# Patient Record
Sex: FEMALE | Race: BLACK OR AFRICAN AMERICAN | NOT HISPANIC OR LATINO
[De-identification: names, ages, dates, MRNs, and addresses within clinical notes are randomized per-mention and may not be internally consistent; named-entity substitution may affect disease eponyms.]

---

## 2022-11-03 ENCOUNTER — APPOINTMENT (OUTPATIENT)
Dept: PAIN MANAGEMENT | Facility: CLINIC | Age: 32
End: 2022-11-03
Payer: OTHER MISCELLANEOUS

## 2022-11-03 VITALS — SYSTOLIC BLOOD PRESSURE: 136 MMHG | HEART RATE: 76 BPM | DIASTOLIC BLOOD PRESSURE: 76 MMHG

## 2022-11-03 PROBLEM — Z00.00 ENCOUNTER FOR PREVENTIVE HEALTH EXAMINATION: Status: ACTIVE | Noted: 2022-11-03

## 2022-11-03 PROCEDURE — 99072 ADDL SUPL MATRL&STAF TM PHE: CPT

## 2022-11-03 PROCEDURE — 99205 OFFICE O/P NEW HI 60 MIN: CPT

## 2022-11-03 RX ORDER — METHYLPREDNISOLONE 4 MG/1
4 TABLET ORAL
Qty: 21 | Refills: 0 | Status: ACTIVE | COMMUNITY
Start: 2022-11-03 | End: 1900-01-01

## 2022-11-03 NOTE — ASSESSMENT
[FreeTextEntry1] : 32-year-old female presenting with severe lumbar radicular pain.  I will further workup her symptoms with an MRI lumbar spine as well as an EMG of the lower extremities.  She will also begin aggressive physical therapy.  For pain control, we will prescribe a Medrol Dosepak.  She will follow-up in 6 weeks for reassessment.\par \par MRI of the lumbar spine was ordered to delineate spine pathology such as disc displacement and stenosis.\par \par An Lower EMG/NCV was ordered to delineate radiculopathy vs neuropathies vs nerve damage.\par \par Physical therapy of the lumbar spine 2-3 times a week for 4-8 weeks stressing a home exercise program of walking, shoulder griddle strengthening,  swimming, elliptical , recumbent bike, Onesimo chi and Yoga. Use things that heat like hot shower or icy heat before rehab, exercising and at the beginning of the day, and ice (ice in a bag never directly on the skin) after activity and at the end of the day.\par \par Disability status:\par Temporary total disability. Patient is unable to return to work at this time.\par \par Entered by Indu Banks, acting as scribe for Dr. Dunn.\par  \par The documentation recorded by the scribe, in my presence, accurately reflects the service I personally performed, and the decisions made by me with my edits as appropriate.\par  \par Best Regards, \par Endy Dunn MD \par Board Certified, Anesthesiology \par Board Certified, Pain Medicine\par \par

## 2022-11-03 NOTE — HISTORY OF PRESENT ILLNESS
[FreeTextEntry1] : Ms. 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  She states she was transferring a patient when she sustained an injury to the right side of her lower lumbar spine.  She states the pain began immediately after the accident.  She states she did go to the urgent care on the same day where an x-ray was taken which was negative.  She states she was given oral medications which did not help.  She currently rates her pain at a 10/10 on the pain scale and is severe.  She states the pain is across the lower lumbar spine with association of radiation into the right lower extremity and into the thigh.  She states the pain is constant in nature.  She states she has significant trouble with sitting, standing or walking secondary to the pain.  She denies any previous injuries to her lower back.\par \par

## 2022-11-03 NOTE — PHYSICAL EXAM
[de-identified] : Constitutional\par GENERAL APPEARANCE OF PATIENT IS WELL DEVELOPED, WELL NOURISHED, BODY HABITUS NORMAL, WELL GROOMED, NO DEFORMITIES NOTED. \par \par Head\par -          Atraumatic and Normocephalic \par \par Eyes, Nose, and Throat:\par -          External inspection of ears and nose are normal overall without scars, lesions, or masses noted\par -          Assessment of hearing is normal\par \par Neck\par -          Examination of neck shows no masses, overall appearance is normal, neck is symmetric, tracheal position is midline, no crepitus is noted\par -          Examination of thyroid shows no enlargement, tenderness or masses\par \par Respiratory\par -          Assessment of respiratory effort shows no intercostal retractions, no use of accessory muscles, unlabored breathing, and normal diaphragmatic movement.\par \par Cardiovascular\par -          Examination of extremities show no edema or varicosities\par \par Musculoskeletal\par -           Inspection and palpation of digits and nails shows no clubbing, cyanosis, nodules, drainage, fluctuance, petechiae\par \par 1)         Spine- tenderness into the lumbar paraspinals. ROM restricted. Pain with flexion and extension. Positive SLR on the right. \par \par 2)         Neck- inspection and palpation shows no misalignment, asymmetry, crepitation, defects, tenderness, masses, effusions. ROM is normal without crepitation or contracture. No instability or subluxation or laxity is noted. No abnormal movements.\par \par 3)         RUE- inspection and palpation shows no misalignment, asymmetry, crepitation, defects, tenderness, masses, effusions. ROM is normal without crepitation or contracture. No instability or subluxation or laxity is noted. No abnormal movements.\par \par 4)         LUE-inspection and palpation shows no misalignment, asymmetry, crepitation, defects, tenderness, masses, effusions. ROM is normal without crepitation or contracture. No instability or subluxation or laxity is noted. No abnormal movements.\par \par 5)         RLE- inspection and palpation shows no misalignment, asymmetry, crepitation, defects, tenderness, masses, effusions. ROM is normal without crepitation or contracture. No instability or subluxation or laxity is noted. No abnormal movements.\par \par 6)         LLE-inspection and palpation shows no misalignment, asymmetry, crepitation, defects, tenderness, masses, effusions. ROM is normal without crepitation or contracture. No instability or subluxation or laxity is noted. No abnormal movements.\par \par Skin\par -           Inspection of skin and subcutaneous tissue shows no rashes, lesions or ulcers\par -           Palpation of skin and subcutaneous tissue shows no rashes, no indurations, subcutaneous nodules or tightening.\par \par  Abdomen\par -           Soft; Non-tender\par \par Neurologic\par -           CN 2-12 are grossly intact\par -           No sensory or motor deficits in the upper and lower extremities\par -           Adequate strength in upper and lower extremities\par \par Psychiatric\par -           Patients judgment and insight are intact\par -           Oriented to time, place and person\par -           Recent and remote memory intact.

## 2022-12-08 ENCOUNTER — APPOINTMENT (OUTPATIENT)
Dept: PAIN MANAGEMENT | Facility: CLINIC | Age: 32
End: 2022-12-08

## 2022-12-08 VITALS — BODY MASS INDEX: 32.49 KG/M2 | WEIGHT: 195 LBS | HEIGHT: 65 IN

## 2022-12-08 PROCEDURE — 99215 OFFICE O/P EST HI 40 MIN: CPT

## 2022-12-08 PROCEDURE — 99072 ADDL SUPL MATRL&STAF TM PHE: CPT

## 2022-12-08 NOTE — PHYSICAL EXAM
[de-identified] : Constitutional\par GENERAL APPEARANCE OF PATIENT IS WELL DEVELOPED, WELL NOURISHED, BODY HABITUS NORMAL, WELL GROOMED, NO DEFORMITIES NOTED. \par \par Head\par -          Atraumatic and Normocephalic \par \par Eyes, Nose, and Throat:\par -          External inspection of ears and nose are normal overall without scars, lesions, or masses noted\par -          Assessment of hearing is normal\par \par Neck\par -          Examination of neck shows no masses, overall appearance is normal, neck is symmetric, tracheal position is midline, no crepitus is noted\par -          Examination of thyroid shows no enlargement, tenderness or masses\par \par Respiratory\par -          Assessment of respiratory effort shows no intercostal retractions, no use of accessory muscles, unlabored breathing, and normal diaphragmatic movement.\par \par Cardiovascular\par -          Examination of extremities show no edema or varicosities\par \par Musculoskeletal\par -           Inspection and palpation of digits and nails shows no clubbing, cyanosis, nodules, drainage, fluctuance, petechiae\par \par 1)         Spine- tenderness into the lumbar paraspinals. ROM restricted. Pain with flexion and extension. Positive SLR on the right. \par \par 2)         Neck- inspection and palpation shows no misalignment, asymmetry, crepitation, defects, tenderness, masses, effusions. ROM is normal without crepitation or contracture. No instability or subluxation or laxity is noted. No abnormal movements.\par \par 3)         RUE- inspection and palpation shows no misalignment, asymmetry, crepitation, defects, tenderness, masses, effusions. ROM is normal without crepitation or contracture. No instability or subluxation or laxity is noted. No abnormal movements.\par \par 4)         LUE-inspection and palpation shows no misalignment, asymmetry, crepitation, defects, tenderness, masses, effusions. ROM is normal without crepitation or contracture. No instability or subluxation or laxity is noted. No abnormal movements.\par \par 5)         RLE- inspection and palpation shows no misalignment, asymmetry, crepitation, defects, tenderness, masses, effusions. ROM is normal without crepitation or contracture. No instability or subluxation or laxity is noted. No abnormal movements.\par \par 6)         LLE-inspection and palpation shows no misalignment, asymmetry, crepitation, defects, tenderness, masses, effusions. ROM is normal without crepitation or contracture. No instability or subluxation or laxity is noted. No abnormal movements.\par \par Skin\par -           Inspection of skin and subcutaneous tissue shows no rashes, lesions or ulcers\par -           Palpation of skin and subcutaneous tissue shows no rashes, no indurations, subcutaneous nodules or tightening.\par \par  Abdomen\par -           Soft; Non-tender\par \par Neurologic\par -           CN 2-12 are grossly intact\par -           No sensory or motor deficits in the upper and lower extremities\par -           Adequate strength in upper and lower extremities\par \par Psychiatric\par -           Patients judgment and insight are intact\par -           Oriented to time, place and person\par -           Recent and remote memory intact.

## 2022-12-08 NOTE — ASSESSMENT
[FreeTextEntry1] : 32-year-old female presenting with ongoing severe lumbar radiculopathy. Her workers compensation carrier has yet to respond to authorization for her MRI of the lumbar spine and EMG of the lower extremities. I advised her she needs to reach out to her  regarding getting these things approved. She does require a form to be filled out for disability. Follow up in 6 weeks will be made for reassessment.\par \par Disability status:\par Temporary total disability. Patient is unable to return to work at this time.\par \par Entered by Indu Banks, acting as scribe for Dr. Dunn.\par  \par The documentation recorded by the scribe, in my presence, accurately reflects the service I personally performed, and the decisions made by me with my edits as appropriate.\par  \par Best Regards, \par Endy Dunn MD \par Board Certified, Anesthesiology \par Board Certified, Pain Medicine\par \par

## 2022-12-08 NOTE — HISTORY OF PRESENT ILLNESS
[FreeTextEntry1] : Ms. 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  She states she was transferring a patient when she sustained an injury to the right side of her lower lumbar spine.  She states the pain began immediately after the accident.  She states she did go to the urgent care on the same day where an x-ray was taken which was negative.  She states she was given oral medications which did not help.  She currently rates her pain at a 10/10 on the pain scale and is severe.  She states the pain is across the lower lumbar spine with association of radiation into the right lower extremity and into the thigh.  She states the pain is constant in nature.  She states she has significant trouble with sitting, standing or walking secondary to the pain.  She denies any previous injuries to her lower back.\par \par TODAY: Revisit encounter. \par \par She continues today with ongoing back pain. She still notes pain which is mainly on the right side of her lower lumbar spine. She has pain which radiates into the right lower extremity extending into the right thigh. She notes some continued numbness and tingling in that area. She states she has ongoing pain with standing or walking. She currently rates her pain at a 8/10 on the pain scale. She states the pain is constant in nature and present daily. She is still awaiting approval of her MRI and EMG by workers compensation.

## 2023-01-02 ENCOUNTER — FORM ENCOUNTER (OUTPATIENT)
Age: 33
End: 2023-01-02

## 2023-01-05 ENCOUNTER — APPOINTMENT (OUTPATIENT)
Dept: PAIN MANAGEMENT | Facility: CLINIC | Age: 33
End: 2023-01-05

## 2023-02-09 ENCOUNTER — APPOINTMENT (OUTPATIENT)
Dept: PAIN MANAGEMENT | Facility: CLINIC | Age: 33
End: 2023-02-09
Payer: OTHER MISCELLANEOUS

## 2023-02-09 VITALS — WEIGHT: 195 LBS | BODY MASS INDEX: 32.49 KG/M2 | HEIGHT: 65 IN

## 2023-02-09 PROCEDURE — 99072 ADDL SUPL MATRL&STAF TM PHE: CPT

## 2023-02-09 PROCEDURE — 99214 OFFICE O/P EST MOD 30 MIN: CPT

## 2023-02-13 NOTE — ASSESSMENT
[FreeTextEntry1] : 32-year-old female presenting with ongoing severe lumbar radiculopathy. Her workers compensation carrier has yet to respond to authorization for her MRI of the lumbar spine and EMG of the lower extremities. I will re-order both the MRI of her lumbar spine and EMG. I advised her she needs to reach out to her  regarding getting these things approved. Follow up in 6 weeks will be made for reassessment.\par \par Disability status:\par Temporary total disability. Patient is unable to return to work at this time.\par \par  Entered by Oumou Sanford, acting as scribe for Dr. Dunn.\par  \par The documentation recorded by the scribe, in my presence, accurately reflects the service I personally performed, and the decisions made by me with my edits as appropriate.\par  \par Best Regards, \par Endy Dunn MD \par Board Certified, Anesthesiology \par Board Certified, Pain Medicine\par \par \par

## 2023-02-13 NOTE — HISTORY OF PRESENT ILLNESS
[FreeTextEntry1] : Ms. 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  She states she was transferring a patient when she sustained an injury to the right side of her lower lumbar spine.  She states the pain began immediately after the accident.  She states she did go to the urgent care on the same day where an x-ray was taken which was negative.  She states she was given oral medications which did not help.  She currently rates her pain at a 10/10 on the pain scale and is severe.  She states the pain is across the lower lumbar spine with association of radiation into the right lower extremity and into the thigh.  She states the pain is constant in nature.  She states she has significant trouble with sitting, standing or walking secondary to the pain.  She denies any previous injuries to her lower back.\par \par TODAY: Revisit encounter. Patient continues to have ongoing back pain. Patient rates her pain 10/10 on pain scale, with pain radiating into her right hip. Patient has difficulty with standing, walking and sitting and is highly frustrated with her symptoms. She states she is unable to sleep secondary to her pain. She notes numbness and tingling in that area. She is still awaiting approval of her MRI and EMG by workers compensation.

## 2023-03-29 ENCOUNTER — APPOINTMENT (OUTPATIENT)
Dept: PAIN MANAGEMENT | Facility: CLINIC | Age: 33
End: 2023-03-29
Payer: OTHER MISCELLANEOUS

## 2023-03-29 VITALS — BODY MASS INDEX: 32.49 KG/M2 | HEIGHT: 65 IN | WEIGHT: 195 LBS

## 2023-03-29 PROCEDURE — 99215 OFFICE O/P EST HI 40 MIN: CPT

## 2023-03-29 NOTE — ASSESSMENT
[FreeTextEntry1] : 32-year-old female presenting with ongoing severe lumbar radiculopathy. She has approval for a MRI of the lumbar spine for which she is scheduled to under this on Wednesday. Follow up in 6 weeks will be made for reassessment and to discuss imaging. \par \par Disability status:\par Temporary total disability. Patient is unable to return to work at this time.\par \par Entered by Indu Banks, acting as scribe for Dr. Dunn.\par  \par The documentation recorded by the scribe, in my presence, accurately reflects the service I personally performed, and the decisions made by me with my edits as appropriate.\par  \par Best Regards, \par Endy Dunn MD \par Board Certified, Anesthesiology \par Board Certified, Pain Medicine\par  \par \par \par

## 2023-03-29 NOTE — PHYSICAL EXAM
[de-identified] : SPINE - tenderness into the lumbar paraspinals. ROM restricted. Pain with flexion and extension. Positive SLR on the right. \par \par HIP - tenderness noted. ROM restricted.

## 2023-03-29 NOTE — HISTORY OF PRESENT ILLNESS
[FreeTextEntry1] : Ms. 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  She states she was transferring a patient when she sustained an injury to the right side of her lower lumbar spine.  She states the pain began immediately after the accident.  She states she did go to the urgent care on the same day where an x-ray was taken which was negative.  She states she was given oral medications which did not help.  She currently rates her pain at a 10/10 on the pain scale and is severe.  She states the pain is across the lower lumbar spine with association of radiation into the right lower extremity and into the thigh.  She states the pain is constant in nature.  She states she has significant trouble with sitting, standing or walking secondary to the pain.  She denies any previous injuries to her lower back.\par \par TODAY: Revisit encounter. Patient continues to have ongoing back pain. Patient rates her pain 10/10 on pain scale, with pain radiating into her right hip. Patient has difficulty with standing, walking and sitting and is highly frustrated with her symptoms. She states she is unable to sleep secondary to her pain. She notes numbness and tingling in that area. She is pending MRI and EMG.

## 2023-04-26 ENCOUNTER — APPOINTMENT (OUTPATIENT)
Dept: PAIN MANAGEMENT | Facility: CLINIC | Age: 33
End: 2023-04-26
Payer: OTHER MISCELLANEOUS

## 2023-04-26 VITALS — HEIGHT: 65 IN | WEIGHT: 195 LBS | BODY MASS INDEX: 32.49 KG/M2

## 2023-04-26 PROCEDURE — 99215 OFFICE O/P EST HI 40 MIN: CPT

## 2023-04-26 NOTE — PHYSICAL EXAM
[de-identified] : SPINE - tenderness into the lumbar paraspinals. ROM restricted. Pain with flexion and extension. Positive SLR on the right. Mild decrease in sensory L5 dermatome bilaterally. Absent Achilles reflex bilaterally. \par \par HIP - tenderness noted. ROM restricted.

## 2023-04-26 NOTE — HISTORY OF PRESENT ILLNESS
[FreeTextEntry1] : Ms. 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  She states she was transferring a patient when she sustained an injury to the right side of her lower lumbar spine.  She states the pain began immediately after the accident.  She states she did go to the urgent care on the same day where an x-ray was taken which was negative.  She states she was given oral medications which did not help.  She currently rates her pain at a 10/10 on the pain scale and is severe.  She states the pain is across the lower lumbar spine with association of radiation into the right lower extremity and into the thigh.  She states the pain is constant in nature.  She states she has significant trouble with sitting, standing or walking secondary to the pain.  She denies any previous injuries to her lower back.\par \par TODAY: Revisit encounter. \par \par She continues today with severe lower back pain.  She states the pain starts in the lower back and travels into the bilateral lower extremities in L5 type distribution.  She states she has severe numbness and tingling in the lower extremities.  She states she has been doing physical therapy which is minimally beneficial.

## 2023-04-26 NOTE — DATA REVIEWED
[FreeTextEntry1] : MRI of the lumbar spine taken on 04/12/2023 showed T11-12 level demonstrates a disc bulge causing impingement upon the anterior thecal sac.  L5-S1 level demonstrates a disc bulge with annular fissure causing impingement upon the epidural 5 bilateral neural foramina.  Contact with exiting bilateral L5 spinal nerves.

## 2023-04-26 NOTE — ASSESSMENT
[FreeTextEntry1] : 32-year-old female presenting with ongoing severe lumbar radiculopathy. Patient is presenting with radicular pain with impairment in ADLs and functionality.  The pain has not responded to conservative care, including medications, stretching, as well as active modalities, such as physical therapy.  Imaging studies as well as physical exam findings corroborate the symptomatology and radicular pain.  We will proceed with an epidural at this point. Follow up in 6 weeks will be made for reassessment. I have explained the findings to the patient and all questions have been answered. \par \par Bilateral L5-S1 transforaminal epidural steroid injection with sedation.\par \par Patient had a MRI that shows a radicular component along with pain referred into the lower extremity. Patient has trialed rehab (Home exercise, physical therapy or chiropractic care) and medications I will schedule a L5-S1 SNRI. \par \par Risk, benefits, pros and cons of procedure were explained to the patient using models and diagrams and their questions were answered. \par \par \par The patient has severe anxiety of procedures that necessitates monitored anesthesia care (MAC). The procedure performed will be close to major nerves, arteries, and spinal cord and/or joint structures. Due to the proximity of these structures, we need the patient to be still during the procedure.  With the help of MAC, this will be safely achieved and decrease the risk of any complications.\par  \par Disability status:\par Temporary total disability. Patient is unable to return to work at this time.\par \par Entered by Indu Banks, acting as scribe for Dr. Dunn.\par  \par The documentation recorded by the scribe, in my presence, accurately reflects the service I personally performed, and the decisions made by me with my edits as appropriate.\par  \par Best Regards, \par Endy Dunn MD \par Board Certified, Anesthesiology \par Board Certified, Pain Medicine\par  \par \par \par

## 2023-05-18 ENCOUNTER — APPOINTMENT (OUTPATIENT)
Dept: PAIN MANAGEMENT | Facility: CLINIC | Age: 33
End: 2023-05-18

## 2023-05-30 ENCOUNTER — APPOINTMENT (OUTPATIENT)
Dept: PAIN MANAGEMENT | Facility: CLINIC | Age: 33
End: 2023-05-30

## 2023-05-31 ENCOUNTER — FORM ENCOUNTER (OUTPATIENT)
Age: 33
End: 2023-05-31

## 2023-06-05 ENCOUNTER — APPOINTMENT (OUTPATIENT)
Dept: PAIN MANAGEMENT | Facility: CLINIC | Age: 33
End: 2023-06-05
Payer: OTHER MISCELLANEOUS

## 2023-06-05 VITALS — WEIGHT: 195 LBS | BODY MASS INDEX: 32.49 KG/M2 | HEIGHT: 65 IN

## 2023-06-05 PROCEDURE — 99215 OFFICE O/P EST HI 40 MIN: CPT

## 2023-06-05 NOTE — ASSESSMENT
[FreeTextEntry1] : 32-year-old female presenting with ongoing severe lumbar radiculopathy. She has approval for a epidural injection for which I will facilitate in getitng her scheduled. Follow up in 6 weeks will be made for reassessment. I have explained the findings to the patient and all questions have been answered. \par  \par Disability status:\par Temporary total disability. Patient is unable to return to work at this time.\par \par Entered by Indu Banks, acting as scribe for Dr. Dunn.\par  \par The documentation recorded by the scribe, in my presence, accurately reflects the service I personally performed, and the decisions made by me with my edits as appropriate.\par  \par Best Regards, \par Endy Dunn MD \par Board Certified, Anesthesiology \par Board Certified, Pain Medicine\par  \par \par \par

## 2023-06-05 NOTE — PHYSICAL EXAM
[de-identified] : SPINE - tenderness into the lumbar paraspinals. ROM restricted. Pain with flexion and extension. Positive SLR on the right. Mild decrease in sensory L5 dermatome bilaterally. Absent Achilles reflex bilaterally. \par \par HIP - tenderness noted. ROM restricted.

## 2023-06-05 NOTE — HISTORY OF PRESENT ILLNESS
[FreeTextEntry1] : Ms. 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  She states she was transferring a patient when she sustained an injury to the right side of her lower lumbar spine.  She states the pain began immediately after the accident.  She states she did go to the urgent care on the same day where an x-ray was taken which was negative.  She states she was given oral medications which did not help.  She currently rates her pain at a 10/10 on the pain scale and is severe.  She states the pain is across the lower lumbar spine with association of radiation into the right lower extremity and into the thigh.  She states the pain is constant in nature.  She states she has significant trouble with sitting, standing or walking secondary to the pain.  She denies any previous injuries to her lower back.\par \par TODAY: Revisit encounter. \par \par She continues today with severe lower back pain.  She states the pain starts in the lower back and travels into the bilateral lower extremities in L5 type distribution.  She states she has severe numbness and tingling in the lower extremities.  She states she has been doing physical therapy which is minimally beneficial.

## 2023-06-14 ENCOUNTER — APPOINTMENT (OUTPATIENT)
Dept: PAIN MANAGEMENT | Facility: CLINIC | Age: 33
End: 2023-06-14
Payer: OTHER MISCELLANEOUS

## 2023-06-14 PROCEDURE — 93040 RHYTHM ECG WITH REPORT: CPT | Mod: 59

## 2023-06-14 PROCEDURE — 64483 NJX AA&/STRD TFRM EPI L/S 1: CPT | Mod: 50

## 2023-06-14 PROCEDURE — 00630 ANES PX LUMBAR REGION NOS: CPT | Mod: QZ,P2

## 2023-06-14 PROCEDURE — 72100 X-RAY EXAM L-S SPINE 2/3 VWS: CPT

## 2023-06-14 PROCEDURE — 94761 N-INVAS EAR/PLS OXIMETRY MLT: CPT

## 2023-06-14 PROCEDURE — 93770 DETERMINATION VENOUS PRESS: CPT

## 2023-06-14 NOTE — PROCEDURE
[FreeTextEntry1] : SELECTIVE TRANSFORAMINAL LUMBAR L5-S1 EPIDURAL NERVE ROOT INJECTION UNDER FLUOROSCOPY\par  [FreeTextEntry3] : SELECTIVE TRANSFORAMINAL LUMBAR L5-S1 EPIDURAL NERVE ROOT INJECTION UNDER FLUOROSCOPY\par \par \par \par Date:  2023\par \par Patient: Mirza Guerin \par \par :  1990\par \par \par Preoperative Diagnosis: Lumbar Radiculopathy\par \par \par \par Procedure:\par 1. Selective Bilateral L5-S1 Transforaminal Lumbar Epidural Nerve Root Injection under Fluoroscopy\par 2. Epidurography\par 3. Fluoroscopic guidance and localization of needle\par \par \par \par Physician: Endy Dunn M.D.\par Anesthesiologist/CRNA: Ms. Youssefmoonjacquie\par Anesthesia: See nurses note, MAC/cold spray IV Sedation versed 2mg, fentanyl 100mcg\par Medical Necessity:  Failure of conservative management.\par \par \par \par Consent:  Though unusual, the possible complications including infection, bleeding, nerve damage, hospital admission, stroke, pneumothorax, death or failure of the procedure are theoretically possible. The patient was educated about the of the procedure and alternative therapies. All questions were answered and the patient freely gave consent to proceed.\par \par \par \par Indication for Fluoroscopy:  This procedure requires the precise placement of the spinal needle into the epidural space.  It is the only way to accurately and safely perform the injection.\par \par \par \par PROCEDURE NOTE:\par After obtaining written consent, the patient was then positioned on the fluoroscopy table in the prone position with a pillow beneath the pelvis to reduce lumbar lordosis. The lumbar area was prepped with chloraprep solution and draped in the usual manner. A time out was performed. The fluoroscope was used to identify the L3///L4///L5 vertebral body on the AP projection. It was then rotated into an oblique projection until the superior articulating process of the S1 (inferior) vertebra is projected beneath the 6 o'clock position of the L5 (superior) vertebrae. The 22 gauge 3-1/2 inch needle was inserted in the skin at a point overlying the superior articulating process of the inferior vertebra and aimed for the 6 o'clock position of the superior vertebrae's pedicle.  After the needle contacted bone, a lateral projection was obtained to insure that the needle tip was in proximity with the vertebral body. Paresthesias were not noted.  One ml of Omnipaque 240 was injected and a neurogram was obtained. Following demonstration of the neurogram, 1 ml of Preservative free normal saline and 1 ml of dexamethasone (10mg) was injected. The small volume and relatively high concentration was chosen to preserve selectivity and diagnostic value of the injection. There was no CSF nor heme identified. The contralateral side was injected in identical fashion.\par \par \par \par Epidurogram: The nerve root was observed in its outline on the neurogram. Distal and proximal spread was noted.\par \par Findings: Lumbar Spine AP and oblique views with x-ray degenerative changes noted.\par \par Complications: none. \par \par Disposition: I have examined the patient and there are no new physical findings since original presentation. The patient was discharged home with a . The discharge instruction sheet was given to the patient. Motor function was intact.\par \par Comment: 1st TFESI today, depending on effectiveness would schedule 2nd TFESI in 1-2 weeks vs caudal epidural steroid vs follow up in office. Call if any problems\par \par  \par \par This document was signed by:\par \par Endy Dunn MD \par Board Certified, Anesthesiology \par Board Certified, Pain Medicine\par \par

## 2023-07-05 ENCOUNTER — APPOINTMENT (OUTPATIENT)
Dept: PAIN MANAGEMENT | Facility: CLINIC | Age: 33
End: 2023-07-05
Payer: OTHER MISCELLANEOUS

## 2023-07-05 VITALS — WEIGHT: 195 LBS | HEIGHT: 65 IN | BODY MASS INDEX: 32.49 KG/M2

## 2023-07-05 PROCEDURE — 99215 OFFICE O/P EST HI 40 MIN: CPT

## 2023-07-05 NOTE — PHYSICAL EXAM
[de-identified] : SPINE - tenderness into the lumbar paraspinals. ROM restricted. Pain with flexion and extension. Positive SLR on the right. Mild decrease in sensory L5 dermatome bilaterally. Absent Achilles reflex bilaterally. \par \par HIP - tenderness noted. ROM restricted.

## 2023-07-05 NOTE — HISTORY OF PRESENT ILLNESS
[FreeTextEntry1] : Ms. 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  She states she was transferring a patient when she sustained an injury to the right side of her lower lumbar spine.  She states the pain began immediately after the accident.  She states she did go to the urgent care on the same day where an x-ray was taken which was negative.  She states she was given oral medications which did not help.  She currently rates her pain at a 10/10 on the pain scale and is severe.  She states the pain is across the lower lumbar spine with association of radiation into the right lower extremity and into the thigh.  She states the pain is constant in nature.  She states she has significant trouble with sitting, standing or walking secondary to the pain.  She denies any previous injuries to her lower back.\par \par TODAY: Since last visit, she is status post bilateral L5-S1 transforaminal epidural steroid injection on 6- with approximately 85% relief for 2 weeks.  She noticed improvement in mobility as well as increase in ability to stand and walk.  Currently, she states her lower back pain is returning.  She currently rates pain as an 8/10 on the pain scale.  She states the pain is constant in nature.  She states the pain travels bilateral lower extremities with associated numbness and tingling in L5 type distribution.  She continues exercises with some relief.\par \par

## 2023-07-05 NOTE — ASSESSMENT
[FreeTextEntry1] : 32-year-old female presenting with ongoing severe lumbar radiculopathy. Patient is presenting with radicular pain with impairment in ADLs and functionality.  The pain has not responded to conservative care, including medications, stretching, as well as active modalities, such as physical therapy.  Imaging studies as well as physical exam findings corroborate the symptomatology and radicular pain.  We will proceed with an epidural at this point. She will also start physical therapy. Follow up in 6 weeks will be made for reassessment. I have explained the findings to the patient and all questions have been answered. \par \par Physical therapy of the lumbar spine 2-3 times a week for 4-8 weeks stressing a home exercise program of walking, shoulder griddle strengthening,  swimming, elliptical , recumbent bike, Onesimo chi and Yoga. Use things that heat like hot shower or icy heat before rehab, exercising and at the beginning of the day, and ice (ice in a bag never directly on the skin) after activity and at the end of the day.\par  \par Bilateral L5-S1 transforaminal epidural steroid injection with sedation.\par \par Patient had a MRI that shows a radicular component along with pain referred into the lower extremity. Patient has trialed rehab (Home exercise, physical therapy or chiropractic care) and medications I will schedule a L5-S1 SNRI. \par  \par Risk, benefits, pros and cons of procedure were explained to the patient using models and diagrams and their questions were answered. \par \par \par The patient has severe anxiety of procedures that necessitates monitored anesthesia care (MAC). The procedure performed will be close to major nerves, arteries, and spinal cord and/or joint structures. Due to the proximity of these structures, we need the patient to be still during the procedure.  With the help of MAC, this will be safely achieved and decrease the risk of any complications.\par  \par Disability status:\par Temporary total disability. Patient is unable to return to work at this time.\par \par Entered by Indu Banks, acting as scribe for Dr. Dunn.\par  \par The documentation recorded by the scribe, in my presence, accurately reflects the service I personally performed, and the decisions made by me with my edits as appropriate.\par  \par Best Regards, \par nEdy Dunn MD \par Board Certified, Anesthesiology \par Board Certified, Pain Medicine\par  \par \par \par

## 2023-07-20 ENCOUNTER — FORM ENCOUNTER (OUTPATIENT)
Age: 33
End: 2023-07-20

## 2023-08-23 ENCOUNTER — APPOINTMENT (OUTPATIENT)
Dept: PAIN MANAGEMENT | Facility: CLINIC | Age: 33
End: 2023-08-23

## 2023-08-30 ENCOUNTER — APPOINTMENT (OUTPATIENT)
Dept: PAIN MANAGEMENT | Facility: CLINIC | Age: 33
End: 2023-08-30

## 2023-09-20 ENCOUNTER — APPOINTMENT (OUTPATIENT)
Dept: PAIN MANAGEMENT | Facility: CLINIC | Age: 33
End: 2023-09-20
Payer: OTHER MISCELLANEOUS

## 2023-09-20 DIAGNOSIS — M54.16 RADICULOPATHY, LUMBAR REGION: ICD-10-CM

## 2023-09-20 PROCEDURE — 99215 OFFICE O/P EST HI 40 MIN: CPT

## 2023-09-27 ENCOUNTER — APPOINTMENT (OUTPATIENT)
Dept: PAIN MANAGEMENT | Facility: CLINIC | Age: 33
End: 2023-09-27

## 2023-09-28 ENCOUNTER — APPOINTMENT (OUTPATIENT)
Dept: PAIN MANAGEMENT | Facility: CLINIC | Age: 33
End: 2023-09-28
Payer: OTHER MISCELLANEOUS

## 2023-09-28 PROCEDURE — 93770 DETERMINATION VENOUS PRESS: CPT

## 2023-09-28 PROCEDURE — 00630 ANES PX LUMBAR REGION NOS: CPT | Mod: QZ,P2

## 2023-09-28 PROCEDURE — 72100 X-RAY EXAM L-S SPINE 2/3 VWS: CPT

## 2023-09-28 PROCEDURE — 93040 RHYTHM ECG WITH REPORT: CPT | Mod: 59

## 2023-09-28 PROCEDURE — 99152Z: CUSTOM

## 2023-09-28 PROCEDURE — 64483 NJX AA&/STRD TFRM EPI L/S 1: CPT | Mod: 50

## 2023-10-19 ENCOUNTER — APPOINTMENT (OUTPATIENT)
Dept: PAIN MANAGEMENT | Facility: CLINIC | Age: 33
End: 2023-10-19

## 2023-10-24 ENCOUNTER — APPOINTMENT (OUTPATIENT)
Dept: PAIN MANAGEMENT | Facility: CLINIC | Age: 33
End: 2023-10-24
Payer: OTHER MISCELLANEOUS

## 2023-10-24 DIAGNOSIS — M54.16 RADICULOPATHY, LUMBAR REGION: ICD-10-CM

## 2023-10-24 PROCEDURE — 99215 OFFICE O/P EST HI 40 MIN: CPT

## 2023-10-26 ENCOUNTER — NON-APPOINTMENT (OUTPATIENT)
Age: 33
End: 2023-10-26

## 2023-12-06 ENCOUNTER — APPOINTMENT (OUTPATIENT)
Dept: PAIN MANAGEMENT | Facility: CLINIC | Age: 33
End: 2023-12-06

## 2024-07-18 ENCOUNTER — APPOINTMENT (OUTPATIENT)
Dept: PAIN MANAGEMENT | Facility: CLINIC | Age: 34
End: 2024-07-18

## 2024-08-22 ENCOUNTER — APPOINTMENT (OUTPATIENT)
Dept: PAIN MANAGEMENT | Facility: CLINIC | Age: 34
End: 2024-08-22
Payer: OTHER MISCELLANEOUS

## 2024-08-22 DIAGNOSIS — M54.50 LOW BACK PAIN, UNSPECIFIED: ICD-10-CM

## 2024-08-22 DIAGNOSIS — M54.16 RADICULOPATHY, LUMBAR REGION: ICD-10-CM

## 2024-08-22 PROCEDURE — 99204 OFFICE O/P NEW MOD 45 MIN: CPT

## 2024-08-23 PROBLEM — M54.50 LOW BACK PAIN, UNSPECIFIED BACK PAIN LATERALITY, UNSPECIFIED CHRONICITY, UNSPECIFIED WHETHER SCIATICA PRESENT: Status: ACTIVE | Noted: 2024-08-23

## 2024-08-23 NOTE — DATA REVIEWED
[FreeTextEntry1] : MRI of the lumbar spine taken on 04/12/2023 showed T11-12 level demonstrates a disc bulge causing impingement upon the anterior thecal sac.  L5-S1 level demonstrates a disc bulge with annular fissure causing impingement upon the epidural L5 bilateral neural foramina.  Contact with exiting bilateral L5 spinal nerves.

## 2024-08-23 NOTE — ASSESSMENT
[FreeTextEntry1] : This is a 34-year-old female presenting with ongoing lumbar radicular pain. She underwent a bilateral L5-S1 transforaminal epidural steroid injection on 9/28/2023. She states she had approximately 80% relief for 3 weeks with improvement in walking, sitting and standing. Last year, a repeat injection was authorized but not performed. Patient is currently 7 months pregnant and notes that her pain is returning to baseline. I advised her that we cannot move forward with the injection as it is done under fluoroscopy. Patient is requesting to trial physical therapy, and I informed her that she would only be able to trial prenatal directed therapy.  She will follow-up in 4 weeks for reassessment. All this patients questions were answered and the conversation was understood well.  Physical therapy, prenatal directed, of the lumbar spine 2-3 times a week for 4-8 weeks stressing a home exercise program of walking/stretching.   Entered by Catia Reaves, acting as scribe for Dr. Rodarte.  Documentation recorded by the scribe, in my presence, accurately reflects the service I personally performed, and the decisions made by me with my edits as appropriate.     Thank you for allowing me to assist in the management of this patient.     Best Regards,     Ivon Rodarte M.D., FAAPMR     Diplomate, American Board of Physical Medicine and Rehabilitation Diplomate, American Board of Pain Medicine

## 2024-08-23 NOTE — HISTORY OF PRESENT ILLNESS
[FreeTextEntry1] : ORIGINAL PRESENTATION: Ms. 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  She states she was transferring a patient when she sustained an injury to the right side of her lower lumbar spine.  She states the pain began immediately after the accident.  She states she did go to the urgent care on the same day where an x-ray was taken which was negative.  She states she was given oral medications which did not help.  She currently rates her pain at a 10/10 on the pain scale and is severe.  She states the pain is across the lower lumbar spine with association of radiation into the right lower extremity and into the thigh.  She states the pain is constant in nature.  She states she has significant trouble with sitting, standing or walking secondary to the pain.  She denies any previous injuries to her lower back.  PATIENT PRESENTS FOR FOLLOW UP: this is a former Dr. Dunn patient transferring her care to me.  She was last seen in the office on 10/24/2023 for complaints of lower back pain with radicular features into the hip. She underwent a bilateral L5-S1 transforaminal epidural steroid injection on 9/28/2023. Patient had approval for a repeat bilateral L5-S1 TFESI with MAC which was not completed because she was pregnant at the time. Unfortunately, that pregnancy did not go to term. Her pain is now returning to baseline. She made me aware that she is now 7 months pregnant. I informed her that we are not able to repeat any injections at this time and there are no medications that are safe to trial. I advised that she could continue with Tylenol for symptom control. She is inquring about trialing physical therapy. I recommended only attending therapy with a therapist who specializes in prenatal care. She voices understanding.

## 2024-08-23 NOTE — HISTORY OF PRESENT ILLNESS
[FreeTextEntry1] : ORIGINAL PRESENTATION: Ms. 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  She states she was transferring a patient when she sustained an injury to the right side of her lower lumbar spine.  She states the pain began immediately after the accident.  She states she did go to the urgent care on the same day where an x-ray was taken which was negative.  She states she was given oral medications which did not help.  She currently rates her pain at a 10/10 on the pain scale and is severe.  She states the pain is across the lower lumbar spine with association of radiation into the right lower extremity and into the thigh.  She states the pain is constant in nature.  She states she has significant trouble with sitting, standing or walking secondary to the pain.  She denies any previous injuries to her lower back.  PATIENT PRESENTS FOR FOLLOW UP: this is a former Dr. Dunn patient transferring her care to me.  She was last seen in the office on 10/24/2023 for complaints of lower back pain with radicular features into the hip. She underwent a bilateral L5-S1 transforaminal epidural steroid injection on 9/28/2023. Patient had approval for a repeat bilateral L5-S1 TFESI with MAC which was not completed because she was pregnant at the time. Unfortunately, that pregnancy did not go to term. Her pain is now returning to baseline. She made me aware that she is now 7 months pregnant. I informed her that we are not able to repeat any injections at this time and there are no medications that are safe to trial. I advised that she could continue with Tylenol for symptom control. She is inquring about trialing physical therapy. I recommended only attending therapy with a therapist who specializes in prenatal care. She voices understanding.

## 2024-08-23 NOTE — PHYSICAL EXAM
[de-identified] : SPINE - tenderness into the lumbar paraspinals. ROM restricted. Pain with flexion and extension. Positive SLR on the right. Mild decrease in sensory L5 dermatome bilaterally. Absent Achilles reflex bilaterally. \par  \par  HIP - tenderness noted. ROM restricted.

## 2024-08-23 NOTE — PHYSICAL EXAM
[de-identified] : SPINE - tenderness into the lumbar paraspinals. ROM restricted. Pain with flexion and extension. Positive SLR on the right. Mild decrease in sensory L5 dermatome bilaterally. Absent Achilles reflex bilaterally. \par  \par  HIP - tenderness noted. ROM restricted.

## 2024-10-03 ENCOUNTER — APPOINTMENT (OUTPATIENT)
Dept: PAIN MANAGEMENT | Facility: CLINIC | Age: 34
End: 2024-10-03